# Patient Record
Sex: FEMALE | Race: WHITE
[De-identification: names, ages, dates, MRNs, and addresses within clinical notes are randomized per-mention and may not be internally consistent; named-entity substitution may affect disease eponyms.]

---

## 2018-12-17 ENCOUNTER — HOSPITAL ENCOUNTER (EMERGENCY)
Dept: HOSPITAL 80 - FED | Age: 53
Discharge: HOME | End: 2018-12-17
Payer: MEDICAID

## 2018-12-17 VITALS — SYSTOLIC BLOOD PRESSURE: 138 MMHG | DIASTOLIC BLOOD PRESSURE: 81 MMHG

## 2018-12-17 DIAGNOSIS — E86.9: ICD-10-CM

## 2018-12-17 DIAGNOSIS — R41.3: Primary | ICD-10-CM

## 2018-12-17 DIAGNOSIS — J32.0: ICD-10-CM

## 2018-12-17 LAB — PLATELET # BLD: 275 10^3/UL (ref 150–400)

## 2018-12-17 PROCEDURE — A9585 GADOBUTROL INJECTION: HCPCS

## 2018-12-17 NOTE — EDPHY
H & P


Time Seen by Provider: 12/17/18 13:41


HPI/ROS: 





Chief complaint.  Headache





HPI.  53-year-old female presents emergency department with several months 

complaint of hard time thinking.  Gradually it seems to be getting worse.  She 

feels connections in her brain are slow or not connecting.  She feels foggy.  

Symptoms have been worse the last 2 days.  She states occasionally wrong words.

  Occasional headache.  Her vision is okay.  No focal weakness or paresthesias 

to arms or legs.  No upper respiratory symptoms.  No fever.  No head injury.  

No chest discomfort or trouble breathing.  No abdominal pain.  Occasional nausea





ROS


10 systems were reviewed and negative with the exception of the elements 

mentioned in the history of present illness





Past Medical/Surgical History: 





Healthy


Social History: 





Single, nonsmoker, no alcohol


Smoking Status: Never smoked


Physical Exam: 





General Appearance:  Alert well-developed female not distress vital signs are 

stable


Eyes: Pupils equal and round no pallor or injection.


ENT, Mouth:  Mucous membranes are moist.


Respiratory:  There are no retractions, lungs are clear to auscultation.


Cardiovascular: Regular rate and rhythm.


Gastrointestinal:   Abdomen is soft and nontender, no masses, bowel sounds 

normal.


Neurological:  Awake and alert, sensory and motor exams grossly normal.  Speech 

is normal.  Cranial nerves are normal.  There is no pronator drift.  Finger-to-

nose and heel-to-shin are intact bilaterally


Skin: Warm and dry, no rashes.


Musculoskeletal:  Neck is supple nontender.


Extremities  symmetrical, full range of motion.


Psychiatric: Patient is oriented X 3, there is no agitation.


Constitutional: 


 Initial Vital Signs











Temperature (C)  36.4 C   12/17/18 13:02


 


Heart Rate  96   12/17/18 13:02


 


Respiratory Rate  16   12/17/18 13:02


 


Blood Pressure  152/94 H  12/17/18 13:02


 


O2 Sat (%)  96   12/17/18 13:02








 











O2 Delivery Mode               Room Air














Allergies/Adverse Reactions: 


 





No Allergies [NKDA] Allergy (Verified 09/09/10 10:01)


 


SEASONAL Allergy (Intermediate, Uncoded 09/09/10 10:02)


 Congestion








Home Medications: 














 Medication  Instructions  Recorded


 


NK [No Known Home Meds]  12/17/18














Medical Decision Making





- Diagnostics


Imaging Results: 


MRI brain without and with contrast reviewed by me and discussed with Radiology 

shows no infarct, hemorrhage, tumor.  She does have left maxillary sinusitis.  

Reviewed by me and discussed with Radiology


Procedures: 





IV normal saline


ED Course/Re-evaluation: 





Re-evaluation 3:30 p.m..  Patient and I discussed imaging and lab results.  We 

discussed treatment plan including criteria for return and importance of follow-

up and further evaluation.  She expresses understanding and agreement


Differential Diagnosis: 





I considered infarct, CVA, hemorrhage, tumor.  It is possible this represents 

early dementia





- Data Points


Laboratory Results: 


 Laboratory Results





 12/17/18 13:50 





 12/17/18 13:50 





 











  12/17/18 12/17/18





  13:50 13:50


 


WBC    7.60 10^3/uL 10^3/uL





    (3.80-9.50) 


 


RBC    4.54 10^6/uL 10^6/uL





    (4.18-5.33) 


 


Hgb    13.8 g/dL g/dL





    (12.6-16.3) 


 


Hct    41.0 % %





    (38.0-47.0) 


 


MCV    90.3 fL fL





    (81.5-99.8) 


 


MCH    30.4 pg pg





    (27.9-34.1) 


 


MCHC    33.7 g/dL g/dL





    (32.4-36.7) 


 


RDW    13.2 % %





    (11.5-15.2) 


 


Plt Count    275 10^3/uL 10^3/uL





    (150-400) 


 


MPV    10.1 fL fL





    (8.7-11.7) 


 


Neut % (Auto)    61.0 % %





    (39.3-74.2) 


 


Lymph % (Auto)    30.8 % %





    (15.0-45.0) 


 


Mono % (Auto)    5.7 % %





    (4.5-13.0) 


 


Eos % (Auto)    1.7 % %





    (0.6-7.6) 


 


Baso % (Auto)    0.5 % %





    (0.3-1.7) 


 


Nucleat RBC Rel Count    0.0 % %





    (0.0-0.2) 


 


Absolute Neuts (auto)    4.64 10^3/uL 10^3/uL





    (1.70-6.50) 


 


Absolute Lymphs (auto)    2.34 10^3/uL 10^3/uL





    (1.00-3.00) 


 


Absolute Monos (auto)    0.43 10^3/uL 10^3/uL





    (0.30-0.80) 


 


Absolute Eos (auto)    0.13 10^3/uL 10^3/uL





    (0.03-0.40) 


 


Absolute Basos (auto)    0.04 10^3/uL 10^3/uL





    (0.02-0.10) 


 


Absolute Nucleated RBC    0.00 10^3/uL 10^3/uL





    (0-0.01) 


 


Immature Gran %    0.3 % %





    (0.0-1.1) 


 


Immature Gran #    0.02 10^3/uL 10^3/uL





    (0.00-0.10) 


 


Sodium  140 mEq/L mEq/L  





   (135-145)  


 


Potassium  4.2 mEq/L mEq/L  





   (3.5-5.2)  


 


Chloride  106 mEq/L mEq/L  





   ()  


 


Carbon Dioxide  24 mEq/l mEq/l  





   (22-31)  


 


Anion Gap  10 mEq/L mEq/L  





   (6-14)  


 


BUN  10 mg/dL mg/dL  





   (7-23)  


 


Creatinine  0.7 mg/dL mg/dL  





   (0.6-1.0)  


 


Estimated GFR  > 60   





   


 


Glucose  96 mg/dL mg/dL  





   ()  


 


Calcium  9.8 mg/dL mg/dL  





   (8.5-10.4)  











Medications Given: 


 








Discontinued Medications





Sodium Chloride (Ns)  1,000 mls @ 0 mls/hr IV EDNOW ONE; Wide Open


   PRN Reason: Protocol


   Stop: 12/17/18 14:03


   Last Admin: 12/17/18 14:07 Dose:  1,000 mls








Departure





- Departure


Disposition: Home, Routine, Self-Care


Clinical Impression: 


 Memory loss of unknown cause





Condition: Good


Instructions:  Sinusitis (ED)


Additional Instructions: 


Flonase as directed for left maxillary sinusitis





Return for worsening symptoms.





You're workup was normal today.  We would like you to follow up with Neurology 

for further evaluation


Referrals: 


NONE *PRIMARY CARE P,. [Primary Care Provider] - As per Instructions


Clark Goff MD [Medical Doctor] - 5-7 days, call for appt.